# Patient Record
Sex: FEMALE | URBAN - METROPOLITAN AREA
[De-identification: names, ages, dates, MRNs, and addresses within clinical notes are randomized per-mention and may not be internally consistent; named-entity substitution may affect disease eponyms.]

---

## 2017-05-15 ENCOUNTER — THERAPY VISIT (OUTPATIENT)
Dept: PHYSICAL THERAPY | Facility: CLINIC | Age: 62
End: 2017-05-15
Payer: OTHER MISCELLANEOUS

## 2017-05-15 DIAGNOSIS — M54.9 UPPER BACK PAIN: Primary | ICD-10-CM

## 2017-05-15 PROCEDURE — 97110 THERAPEUTIC EXERCISES: CPT | Mod: GP | Performed by: PHYSICAL THERAPIST

## 2017-05-15 PROCEDURE — 97161 PT EVAL LOW COMPLEX 20 MIN: CPT | Mod: GP | Performed by: PHYSICAL THERAPIST

## 2017-05-15 PROCEDURE — 97112 NEUROMUSCULAR REEDUCATION: CPT | Mod: GP | Performed by: PHYSICAL THERAPIST

## 2017-05-15 NOTE — PROGRESS NOTES
[facility] Initial Evaluation      Subjective:    Patient is a 61 year old female presenting with rehab cervical spine hpi.   Sherrill Bermeo is a 61 year old female with a cervical spine and thoracic spine condition.  Condition occurred with:  A fall/slip.  Condition occurred: at work.  This is a new condition  January 1, 2017..    Patient reports pain:  Lower cervical spine, thoracic right side, thoracic left side, upper thoracic and mid thoracic.    Pain is described as aching and is intermittent and reported as 7/10.  Associated symptoms:  Loss of motion/stiffness. Pain is worse in the A.M. and worse in the P.M..  Symptoms are exacerbated by lifting and sitting and relieved by rest, analgesics and NSAID's.  Since onset symptoms are unchanged.  Special testing: NONE.  Previous treatment: NONE.    General health as reported by patient is good.  Pertinent medical history includes:  Menopausal and osteoporosis.  Medical allergies: yes.  Other surgeries include:  Other.  Medication history: BONE DENSITY.  Current occupation is [employer].    Primary job tasks include:  Prolonged standing and prolonged sitting.    Barriers include:  None as reported by the patient.    Red flags:  None as reported by the patient.                        Objective:    System              Cervical/Thoracic Evaluation      Cervical Myotomes:      C3 (neck side bend): Right: 5  C4 (shrug):  Left: 5    Right: 5  C5 (Deltoid):  Left: 5    Right: 5  C6 (Biceps):  Left: 5    Right: 5  C7 (Triceps):  Left: 5    Right: 5  C8 (Thumb Ext): Left: 5    Right: 5  T1 (Intrinsics): Left: 5    Right: 5                                                          [place] Cervical Evaluation    Posture:  Sitting: poor          Other Observations: INCONCLUSIVE POSTURE CHANGE.   Movement Loss:  Protrusion (PRO): nil  Flexion (Flex): nil  Retraction (RET): nil  Extension (EXT): nil  Lateral Flexion Right (LF R): mod  Lateral Flexion Left (LF L):  mod  Rotation Right (ROT R): nil  Rotation Left (ROT L): nil  Test Movements:    PRO: During: no effect  After: no better and no worse    Repeat PRO: During: no effect    RET: During: no effect    Repeat RET: During: no effect    RET EXT: During: centralizing    Repeat RET EXT: During: centralizing                          Conclusion: other (APPEARS TO BE DERANGEMENT. )  Principle of Treatment:  Posture Correction: IN SITTING WITH TOWEL ROLL.     Extension: RETRACTION EXTENSION.     Other: NEUTRAL SPINE, THER EX, THER ACT, NMR,MANUAL THERAPY                       [place] Thoracic Evalution:    Movement Loss:  Flexion (Flex):  Nil  Extension (EXT): nil  Rotation Lt (ROT L): nil  Rotation Rt (ROT R): nil  Cervical Differential:                Rep Flex:  During:  No effect    Test Movements:  Flexion:  During:  No effect    Rep Flexion:  During:  No effect    Extension:  During:  Decreases    Rep Extension:  During:  Decreases                  Rot Rt:  During:  No effect      ROT Lt: During:  No effect        Conclusion: dysfunction  Principle of Treatment:  Posture Correction:  IN SITTING WITH TOWEL ROLL.     Extension:  SEATED THORACIC EXT.     Other:  NEUTRAL SPINE, THER EX, THER ACT, NMR, MANUAL THERAPY.                   ROS    Assessment/Plan:      Patient is a 61 year old female with cervical and thoracic complaints.    Patient has the following significant findings with corresponding treatment plan.                Diagnosis 1:  UPPER BACK PAIN  Pain -  hot/cold therapy, US, electric stimulation, manual therapy, splint/taping/bracing/orthotics, self management, education, directional preference exercise and home program  Decreased function - therapeutic activities, home program and functional performance testing  Impaired posture - neuro re-education, therapeutic activities and home program    Therapy Evaluation Codes:   1) History comprised of:   Personal factors that impact the plan of care:      None.     Comorbidity factors that impact the plan of care are:      OSTEOPOROSIS.     Medications impacting care: None.  2) Examination of Body Systems comprised of:   Body structures and functions that impact the plan of care:      Cervical spine and Thoracic Spine.   Activity limitations that impact the plan of care are:      Bending, Cooking, Driving, Lifting, Reading/Computer work, Sitting, Standing and Working.  3) Clinical presentation characteristics are:   Stable/Uncomplicated.  4) Decision-Making    Low complexity using standardized patient assessment instrument and/or measureable assessment of functional outcome.  Cumulative Therapy Evaluation is: Low complexity.    Previous and current functional limitations:  (See Goal Flow Sheet for this information)    Short term and Long term goals: (See Goal Flow Sheet for this information)     Communication ability:  Patient appears to be able to clearly communicate and understand verbal and written communication and follow directions correctly.  Treatment Explanation - The following has been discussed with the patient:   RX ordered/plan of care  Anticipated outcomes  Possible risks and side effects  This patient would benefit from PT intervention to resume normal activities.   Rehab potential is good.    Frequency:  1 X week, once daily  Duration:  for 6 weeks  Discharge Plan:  Achieve all LTG.  Independent in home treatment program.  Reach maximal therapeutic benefit.    Please refer to the daily flowsheet for treatment today, total treatment time and time spent performing 1:1 timed codes.

## 2017-05-15 NOTE — LETTER
[facility]  [address]  [facility] [address]  [phone]    May 22, 2017    Re: Sherrill Bermeo   :   1955  MRN:  [id number]   REFERRING PHYSICIAN:   Belen Valencia  [facility]  Date of Initial Evaluation:  05/15/2017  Visits:  Rxs Used: 1  Reason for Referral:  Upper back pain  EVALUATION SUMMARY  [facility] Initial Evaluation  Subjective:  Patient is a 61 year old female presenting with rehab cervical spine hpi.   Sherrill Bermeo is a 61 year old female with a cervical spine and thoracic spine condition.  Condition occurred with:  A fall/slip.  Condition occurred: at work.  This is a new condition  2017..    Patient reports pain:  Lower cervical spine, thoracic right side, thoracic left side, upper thoracic and mid thoracic.    Pain is described as aching and is intermittent and reported as 7/10.  Associated symptoms:  Loss of motion/stiffness. Pain is worse in the A.M. and worse in the P.M..  Symptoms are exacerbated by lifting and sitting and relieved by rest, analgesics and NSAID's.  Since onset symptoms are unchanged.  Special testing: NONE.  Previous treatment: NONE.    General health as reported by patient is good.  Pertinent medical history includes:  Menopausal and osteoporosis.  Medical allergies: yes.  Other surgeries include:  Other.  Medication history: BONE DENSITY.  Current occupation is [employer].    Primary job tasks include:  Prolonged standing and prolonged sitting.  Barriers include:  None as reported by the patient.  Red flags:  None as reported by the patient.      Objective:  System  Cervical/Thoracic Evaluation  Cervical Myotomes:    C3 (neck side bend): Right: 5  C4 (shrug):  Left: 5    Right: 5  C5 (Deltoid):  Left: 5    Right: 5  C6 (Biceps):  Left: 5    Right: 5  C7 (Triceps):  Left: 5    Right: 5  C8 (Thumb Ext): Left: 5    Right: 5  T1 (Intrinsics): Left: 5    Right:  5  [place] Cervical Evaluation  Posture:  Sitting: poor  Other Observations: INCONCLUSIVE POSTURE CHANGE.   Movement Loss:  Protrusion (PRO): nil  Flexion (Flex): nil  Retraction (RET): nil  Extension (EXT): nil  Lateral Flexion Right (LF R): mod  Lateral Flexion Left (LF L): mod  Rotation Right (ROT R): nil  Rotation Left (ROT L): nil  Test Movements:  PRO: During: no effect  After: no better and no worse    Repeat PRO: During: no effect    RET: During: no effect    Repeat RET: During: no effect    RET EXT: During: centralizing    Repeat RET EXT: During: centralizing                          Re: Sherrill Bermeo             :   1955    Conclusion: other (APPEARS TO BE DERANGEMENT. )  Principle of Treatment:  Posture Correction: IN SITTING WITH TOWEL ROLL.   Extension: RETRACTION EXTENSION.   Other: NEUTRAL SPINE, THER EX, THER ACT, NMR,MANUAL THERAPY      [place] Thoracic Evalution:  Movement Loss:  Flexion (Flex):  Nil  Extension (EXT): nil  Rotation Lt (ROT L): nil  Rotation Rt (ROT R): nil  Cervical Differential:  Rep Flex:  During:  No effect    Test Movements:  Flexion:  During:  No effect    Rep Flexion:  During:  No effect    Extension:  During:  Decreases    Rep Extension:  During:  Decreases    Rot Rt:  During:  No effect    ROT Lt: During:  No effect      Conclusion: dysfunction  Principle of Treatment:  Posture Correction:  IN SITTING WITH TOWEL ROLL.   Extension:  SEATED THORACIC EXT.   Other:  NEUTRAL SPINE, THER EX, THER ACT, NMR, MANUAL THERAPY.                 Assessment/Plan:    Patient is a 61 year old female with cervical and thoracic complaints.    Patient has the following significant findings with corresponding treatment plan.                Diagnosis 1:  UPPER BACK PAIN  Pain -  hot/cold therapy, US, electric stimulation, manual therapy, splint/taping/bracing/orthotics, self management, education, directional preference exercise and home program  Decreased function - therapeutic  activities, home program and functional performance testing  Impaired posture - neuro re-education, therapeutic activities and home program    Therapy Evaluation Codes:   1) History comprised of:   Personal factors that impact the plan of care:      None.    Comorbidity factors that impact the plan of care are:      OSTEOPOROSIS.     Medications impacting care: None.  2) Examination of Body Systems comprised of:   Body structures and functions that impact the plan of care:      Cervical spine and Thoracic Spine.   Activity limitations that impact the plan of care are:      Bending, Cooking, Driving, Lifting, Reading/Computer work, Sitting, Standing and Working.  3) Clinical presentation characteristics are:   Stable/Uncomplicated.  4) Decision-Making    Low complexity using standardized patient assessment instrument and/or measureable assessment of functional outcome.  Cumulative Therapy Evaluation is: Low complexity.                            Re: Sherrill Bermeo             :   1955      Previous and current functional limitations:  (See Goal Flow Sheet for this information)    Short term and Long term goals: (See Goal Flow Sheet for this information)     Communication ability:  Patient appears to be able to clearly communicate and understand verbal and written communication and follow directions correctly.  Treatment Explanation - The following has been discussed with the patient:   RX ordered/plan of care  Anticipated outcomes  Possible risks and side effects  This patient would benefit from PT intervention to resume normal activities.   Rehab potential is good.    Frequency:  1 X week, once daily  Duration:  for 6 weeks  Discharge Plan:  Achieve all LTG.  Independent in home treatment program.  Reach maximal therapeutic benefit.    Please refer to the daily flowsheet for treatment today, total treatment time and time spent performing 1:1 timed codes.     Thank you for your  referral.    INQUIRIES  Therapist: Jean Paul Villalobos, PT  [facility]  [address]  [facility] [address]  Phone: [phone]  Fax: [phone]

## 2017-05-15 NOTE — MR AVS SNAPSHOT
"              After Visit Summary   5/15/2017    Sherrill Bermeo    MRN: [id number]           Patient Information     Date Of Birth          1955        Visit Information        Provider Department      5/15/2017 5:20 PM London Ang PT [facility]        Today's Diagnoses     Upper back pain    -  1       Follow-ups after your visit        Who to contact     If you have questions or need follow up information about today's clinic visit or your schedule please contact [facility] directly at [phone].  Normal or non-critical lab and imaging results will be communicated to you by Phylogyhart, letter or phone within 4 business days after the clinic has received the results. If you do not hear from us within 7 days, please contact the clinic through SAFCellt or phone. If you have a critical or abnormal lab result, we will notify you by phone as soon as possible.  Submit refill requests through General Compression or call your pharmacy and they will forward the refill request to us. Please allow 3 business days for your refill to be completed.          Additional Information About Your Visit        MyChart Information     General Compression lets you send messages to your doctor, view your test results, renew your prescriptions, schedule appointments and more. To sign up, go to [email or web address] . Click on \"Log in\" on the left side of the screen, which will take you to the Welcome page. Then click on \"Sign up Now\" on the right side of the page.     You will be asked to enter the access code listed below, as well as some personal information. Please follow the directions to create your username and password.     Your access code is: [email or web address]  Expires: 2017  6:43 PM     Your access code will  in 90 days. If you need help or a new code, please call your [facility] clinic or [phone].        Care EveryWhere ID     This is your Care EveryWhere ID. " This could be used by other organizations to access your [facility] medical records  [id number]         Blood Pressure from Last 3 Encounters:   No data found for BP    Weight from Last 3 Encounters:   No data found for Wt              We Performed the Following     NADIA Inital Eval Report     Neuromuscular Re-Education     PT Eval, Low Complexity (22721)     Therapeutic Exercises        Primary Care Provider    None Specified       No primary provider on file.        Thank you!     Thank you for choosing [facility]  for your care. Our goal is always to provide you with excellent care. Hearing back from our patients is one way we can continue to improve our services. Please take a few minutes to complete the written survey that you may receive in the mail after your visit with us. Thank you!             Your Updated Medication List - Protect others around you: Learn how to safely use, store and throw away your medicines at www.disposemymeds.org.      Notice  As of 5/15/2017  6:43 PM    You have not been prescribed any medications.

## 2017-05-25 ENCOUNTER — THERAPY VISIT (OUTPATIENT)
Dept: PHYSICAL THERAPY | Facility: CLINIC | Age: 62
End: 2017-05-25
Payer: OTHER MISCELLANEOUS

## 2017-05-25 DIAGNOSIS — M54.9 UPPER BACK PAIN: ICD-10-CM

## 2017-05-25 PROCEDURE — 97140 MANUAL THERAPY 1/> REGIONS: CPT | Mod: GP | Performed by: PHYSICAL THERAPIST

## 2017-05-25 PROCEDURE — 97110 THERAPEUTIC EXERCISES: CPT | Mod: GP | Performed by: PHYSICAL THERAPIST

## 2017-05-25 PROCEDURE — 97112 NEUROMUSCULAR REEDUCATION: CPT | Mod: GP | Performed by: PHYSICAL THERAPIST

## 2017-06-02 ENCOUNTER — THERAPY VISIT (OUTPATIENT)
Dept: PHYSICAL THERAPY | Facility: CLINIC | Age: 62
End: 2017-06-02
Payer: OTHER MISCELLANEOUS

## 2017-06-02 DIAGNOSIS — M54.9 UPPER BACK PAIN: ICD-10-CM

## 2017-06-02 PROCEDURE — 97112 NEUROMUSCULAR REEDUCATION: CPT | Mod: GP

## 2017-06-02 PROCEDURE — 97110 THERAPEUTIC EXERCISES: CPT | Mod: GP

## 2017-06-02 PROCEDURE — 97140 MANUAL THERAPY 1/> REGIONS: CPT | Mod: GP

## 2017-06-08 ENCOUNTER — THERAPY VISIT (OUTPATIENT)
Dept: PHYSICAL THERAPY | Facility: CLINIC | Age: 62
End: 2017-06-08
Payer: OTHER MISCELLANEOUS

## 2017-06-08 DIAGNOSIS — M54.9 UPPER BACK PAIN: ICD-10-CM

## 2017-06-08 PROCEDURE — 97140 MANUAL THERAPY 1/> REGIONS: CPT | Mod: GP

## 2017-06-08 PROCEDURE — 97112 NEUROMUSCULAR REEDUCATION: CPT | Mod: GP

## 2017-06-08 PROCEDURE — 97110 THERAPEUTIC EXERCISES: CPT | Mod: GP

## 2017-06-15 ENCOUNTER — THERAPY VISIT (OUTPATIENT)
Dept: PHYSICAL THERAPY | Facility: CLINIC | Age: 62
End: 2017-06-15
Payer: OTHER MISCELLANEOUS

## 2017-06-15 DIAGNOSIS — M54.9 UPPER BACK PAIN: ICD-10-CM

## 2017-06-15 PROCEDURE — 97110 THERAPEUTIC EXERCISES: CPT | Mod: GP | Performed by: PHYSICAL THERAPIST

## 2017-06-15 PROCEDURE — 97530 THERAPEUTIC ACTIVITIES: CPT | Mod: GP | Performed by: PHYSICAL THERAPIST

## 2017-06-15 PROCEDURE — 97112 NEUROMUSCULAR REEDUCATION: CPT | Mod: GP | Performed by: PHYSICAL THERAPIST

## 2017-06-22 ENCOUNTER — THERAPY VISIT (OUTPATIENT)
Dept: PHYSICAL THERAPY | Facility: CLINIC | Age: 62
End: 2017-06-22
Payer: OTHER MISCELLANEOUS

## 2017-06-22 DIAGNOSIS — M54.9 UPPER BACK PAIN: ICD-10-CM

## 2017-06-22 PROCEDURE — 97110 THERAPEUTIC EXERCISES: CPT | Mod: GP | Performed by: PHYSICAL THERAPIST

## 2017-06-22 PROCEDURE — 97112 NEUROMUSCULAR REEDUCATION: CPT | Mod: GP | Performed by: PHYSICAL THERAPIST

## 2017-06-22 NOTE — MR AVS SNAPSHOT
"              After Visit Summary   2017    Sherrill Bermeo    MRN: [id number]           Patient Information     Date Of Birth          1955        Visit Information        Provider Department      2017 8:50 AM London Ang PT [facility]        Today's Diagnoses     Upper back pain           Follow-ups after your visit        Who to contact     If you have questions or need follow up information about today's clinic visit or your schedule please contact [facility] directly at [phone].  Normal or non-critical lab and imaging results will be communicated to you by Auto I.D.hart, letter or phone within 4 business days after the clinic has received the results. If you do not hear from us within 7 days, please contact the clinic through Health Global Connectt or phone. If you have a critical or abnormal lab result, we will notify you by phone as soon as possible.  Submit refill requests through OrSense or call your pharmacy and they will forward the refill request to us. Please allow 3 business days for your refill to be completed.          Additional Information About Your Visit        MyChart Information     OrSense lets you send messages to your doctor, view your test results, renew your prescriptions, schedule appointments and more. To sign up, go to [email or web address] . Click on \"Log in\" on the left side of the screen, which will take you to the Welcome page. Then click on \"Sign up Now\" on the right side of the page.     You will be asked to enter the access code listed below, as well as some personal information. Please follow the directions to create your username and password.     Your access code is: [email or web address]  Expires: 2017  6:43 PM     Your access code will  in 90 days. If you need help or a new code, please call your [place] clinic or [phone].        Care EveryWhere ID     This is your Care EveryWhere ID. This " could be used by other organizations to access your [facility] medical records  [id number]         Blood Pressure from Last 3 Encounters:   No data found for BP    Weight from Last 3 Encounters:   No data found for Wt              We Performed the Following     NADIA Progress Notes Report     Neuromuscular Re-Education     Therapeutic Exercises        Primary Care Provider    None Specified       No primary provider on file.        Equal Access to Services     JAIME BEAVER : Hadii aad ku hadasho Soomaali, waaxda luqadaha, qaybta kaalmada beth, briana dashgentryvania mott . So [facility] [phone].    ATENCIÓN: Si habla español, tiene a beck disposición servicios gratuitos de asistencia lingüística. Llame al [phone].    We comply with applicable federal civil rights laws and [place] laws. We do not discriminate on the basis of race, color, national origin, age, disability sex, sexual orientation or gender identity.            Thank you!     Thank you for choosing [facility]  for your care. Our goal is always to provide you with excellent care. Hearing back from our patients is one way we can continue to improve our services. Please take a few minutes to complete the written survey that you may receive in the mail after your visit with us. Thank you!             Your Updated Medication List - Protect others around you: Learn how to safely use, store and throw away your medicines at www.disposemymeds.org.      Notice  As of 6/22/2017  1:09 PM    You have not been prescribed any medications.

## 2017-06-22 NOTE — PROGRESS NOTES
Subjective:    HPI                    Objective:    System    Physical Exam    General     ROS    Assessment/Plan:      DISCHARGE REPORT    Progress reporting period is from 5/15/2017 to 6/22/2017.     SUBJECTIVE  Subjective: NO PAIN.   Current Pain level: 0/10   Initial Pain level: 7/10   Changes in function: Yes, see goal flow sheet for change in function    ;   ,     The subjective and objective information are from the last SOAP note on this patient.    OBJECTIVE  Objective: Cv FLEX, EXT, R AND L ROT: FULL PAIN FREE.    PERFORMS RETRACTION AND RETRACTION EXTENSION WELL.       ASSESSMENT/PLAN  Updated problem list and treatment plan: Diagnosis 1:  UPPER BACK PAIN     PROGRESSING WELL.    STG/LTGs have been met or progress has been made towards goals:  Yes (See Goal flow sheet completed today.)  Assessment of Progress: The patient's condition is improving.  Self Management Plans:  Patient has been instructed in a home treatment program.  I have re-evaluated this patient and find that the nature, scope, duration and intensity of the therapy is appropriate for the medical condition of the patient.  Sherrill continues to require the following intervention to meet STG and LTG's: PT intervention is no longer required to meet STG/LTG.  We will discharge this patient from PT.    Recommendations:  This patient is ready to be discharged from therapy and continue their home treatment program.    Please refer to the daily flowsheet for treatment today, total treatment time and time spent performing 1:1 timed codes.